# Patient Record
Sex: FEMALE | Race: BLACK OR AFRICAN AMERICAN | Employment: STUDENT | URBAN - METROPOLITAN AREA
[De-identification: names, ages, dates, MRNs, and addresses within clinical notes are randomized per-mention and may not be internally consistent; named-entity substitution may affect disease eponyms.]

---

## 2021-08-18 ENCOUNTER — APPOINTMENT (OUTPATIENT)
Dept: URGENT CARE | Facility: CLINIC | Age: 26
End: 2021-08-18

## 2021-08-18 DIAGNOSIS — Z02.1 PRE-EMPLOYMENT HEALTH SCREENING EXAMINATION: Primary | ICD-10-CM

## 2021-08-18 PROCEDURE — 86480 TB TEST CELL IMMUN MEASURE: CPT

## 2021-08-20 LAB
GAMMA INTERFERON BACKGROUND BLD IA-ACNC: 0.03 IU/ML
M TB IFN-G BLD-IMP: NEGATIVE
M TB IFN-G CD4+ BCKGRND COR BLD-ACNC: -0.01 IU/ML
M TB IFN-G CD4+ BCKGRND COR BLD-ACNC: -0.01 IU/ML
MITOGEN IGNF BCKGRD COR BLD-ACNC: >10 IU/ML

## 2024-08-19 ENCOUNTER — EMERGENCY (EMERGENCY)
Facility: HOSPITAL | Age: 29
LOS: 1 days | Discharge: HOME | End: 2024-08-19
Attending: EMERGENCY MEDICINE | Admitting: EMERGENCY MEDICINE
Payer: COMMERCIAL

## 2024-08-19 VITALS
DIASTOLIC BLOOD PRESSURE: 71 MMHG | OXYGEN SATURATION: 100 % | HEART RATE: 91 BPM | RESPIRATION RATE: 18 BRPM | SYSTOLIC BLOOD PRESSURE: 105 MMHG | HEIGHT: 66 IN | WEIGHT: 164.91 LBS | TEMPERATURE: 99 F

## 2024-08-19 PROCEDURE — 93010 ELECTROCARDIOGRAM REPORT: CPT

## 2024-08-19 PROCEDURE — 99284 EMERGENCY DEPT VISIT MOD MDM: CPT

## 2024-08-19 PROCEDURE — 71046 X-RAY EXAM CHEST 2 VIEWS: CPT | Mod: 26

## 2024-08-19 RX ORDER — DEXAMETHASONE 1.5 MG/1
10 TABLET ORAL ONCE
Refills: 0 | Status: DISCONTINUED | OUTPATIENT
Start: 2024-08-19 | End: 2024-08-19

## 2024-08-19 RX ORDER — DEXAMETHASONE 1.5 MG/1
10 TABLET ORAL ONCE
Refills: 0 | Status: COMPLETED | OUTPATIENT
Start: 2024-08-19 | End: 2024-08-19

## 2024-08-19 RX ORDER — ALBUTEROL 90 MCG
2 AEROSOL REFILL (GRAM) INHALATION
Qty: 1 | Refills: 0
Start: 2024-08-19

## 2024-08-19 RX ORDER — IPRATROPIUM BROMIDE AND ALBUTEROL SULFATE 2.5; .5 MG/3ML; MG/3ML
3 SOLUTION RESPIRATORY (INHALATION) ONCE
Refills: 0 | Status: COMPLETED | OUTPATIENT
Start: 2024-08-19 | End: 2024-08-19

## 2024-08-19 RX ADMIN — IPRATROPIUM BROMIDE AND ALBUTEROL SULFATE 3 MILLILITER(S): 2.5; .5 SOLUTION RESPIRATORY (INHALATION) at 22:26

## 2024-08-19 NOTE — ED ADULT TRIAGE NOTE - CHIEF COMPLAINT QUOTE
Pt presents to ED ambulatory from home with c/o cough, chest pain and shortness of breath x 1 day. Pt reports hx of asthma, diagnosed with COVID last week.

## 2024-08-19 NOTE — ED PROVIDER NOTE - PATIENT PORTAL LINK FT
You can access the FollowMyHealth Patient Portal offered by [facility] by registering at the following website: [email or web address]. By joining [facility] FollowMyHealth portal, you will also be able to view your health information using other applications (apps) compatible with our system.

## 2024-08-19 NOTE — ED ADULT TRIAGE NOTE - HEIGHT IN CM
Patient Education     Viral Syndrome (Child)  A virus is the most common cause of illness among children. This may cause a number of different symptoms, depending on what part of the body is affected. If the virus settles in the nose, throat, and lungs, it causes cough, congestion, and sometimes headache. If it settles in the stomach and intestinal tract, it causes vomiting and diarrhea. Sometimes it causes vague symptoms of \"feeling bad all over,\" with fussiness, poor appetite, poor sleeping, and lots of crying. A light rash may also appear for the first few days, then fade away.  A viral illness usually lasts 3 to 5 days, but sometimes it lasts longer, even up to 1 to 2 weeks. Home measures are all that are needed to treat a viral illness. Antibiotics don't help. Occasionally, a more serious bacterial infection can look like a viral syndrome in the first few days of the illness.   Home care  Follow these guidelines to care for your child at home:  · Fluids. Fever increases water loss from the body. For infants under 1 year old, continue regular feedings (formula or breast). Between feedings give oral rehydration solution, which is available from groceries and drugstores without a prescription. For children older than 1 year, give plenty of fluids like water, juice, ginger ale, lemonade, fruit-based drinks, or popsicles.    · Food. If your child doesn't want to eat solid foods, it's OK for a few days, as long as he or she drinks lots of fluid. (If your child has been diagnosed with a kidney disease, ask your child’s doctor how much and what types of fluids your child should drink to prevent dehydration. If your child has kidney disease, drinking too much fluid can cause it build up in the body and be dangerous to your child’s health.)  · Activity. Keep children with a fever at home resting or playing quietly. Encourage frequent naps. Your child may return to day care or school when the fever is gone and he or she  is eating well and feeling better.  · Sleep. Periods of sleeplessness and irritability are common. Give your child plenty of time to sleep.  ? For children 1 year and older: Have your child sleep in a slightly upright position. This is to help make breathing easier. If possible, raise the head of the bed slightly. Or raise your older child’s head and upper body up with extra pillows. Talk with your healthcare provider about how far to raise your child's head.  ? For babies younger than 12 months:  Never use pillows or put your baby to sleep on their stomach or side. Babies younger than 12 months should sleep on a flat, firm surface on their back. Don't use car seats, strollers, swings, baby carriers, or baby slings for sleep. If your baby falls asleep in one of these, move them to a flat, firm surface as soon as you can.  · Cough. Coughing is a normal part of this illness. A cool mist humidifier at the bedside may be helpful. Over-the-counter (OTC) cough and cold medicine has not been proved to be any more helpful than sweet syrup with no medicine in it. But these medicines can produce serious side effects, especially in infants younger than 2 years. Don’t give OTC cough and cold medicines to children under age 6 years unless your healthcare provider has specifically advised you to do so. Also, don’t expose your child to cigarette smoke. It can make the cough worse.  · Nasal congestion. Suction the nose of infants with a rubber bulb syringe. You may put 2 to 3 drops of saltwater (saline) nose drops in each nostril before suctioning to help remove secretions. Saline nose drops are available without a prescription. You can make it by adding 1/4 teaspoon table salt in 1 cup of water.  · Fever. You may give your child acetaminophen or ibuprofen to control pain and fever, unless another medicine was prescribed for this. If your child has chronic liver or kidney disease or ever had a stomach ulcer or gastrointestinal  bleeding, talk with your healthcare provider before using these medicines. Don't give aspirin to anyone younger than 18 years who is ill with a fever. It may cause severe disease or death.  · Prevention. Wash your hands before and after touching your sick child to help prevent giving a new illness to your child and to prevent spreading this viral illness to yourself and to other children.  Follow-up care  Follow up with your child's healthcare provider as advised.  When to seek medical advice  Unless your child's healthcare provider advises otherwise, call the provider right away if:  · Your child has a fever (see Fever and children, below)  · Your child is fussy or crying and cannot be soothed  · Your child has an earache, sinus pain, stiff or painful neck, or headache  · Your child has increasing abdominal pain or pain that is not getting better after 8 hours  · Your child has repeated diarrhea or vomiting  · A new rash appears  · Your child has signs of dehydration: No wet diapers for 8 hours in infants, little or no urine older children, very dark urine, sunken eyes  · Your child has burning when urinating  Call 911  Call 911 if any of the following occur:  · Lips or skin that turn blue, purple, or gray  · Neck stiffness or rash with a fever  · Convulsion (seizure)  · Wheezing or trouble breathing  · Unusual fussiness or drowsiness  · Confusion  Fever and children  Always use a digital thermometer to check your child’s temperature. Never use a mercury thermometer.  For infants and toddlers, be sure to use a rectal thermometer correctly. A rectal thermometer may accidentally poke a hole in (perforate) the rectum. It may also pass on germs from the stool. Always follow the product maker’s directions for proper use. If you don’t feel comfortable taking a rectal temperature, use another method. When you talk to your child’s healthcare provider, tell him or her which method you used to take your child’s  temperature.  Here are guidelines for fever temperature. Ear temperatures aren’t accurate before 6 months of age. Don’t take an oral temperature until your child is at least 4 years old.  Infant under 3 months old:  · Ask your child’s healthcare provider how you should take the temperature.  · Rectal or forehead (temporal artery) temperature of 100.4°F (38°C) or higher, or as directed by the provider  · Armpit temperature of 99°F (37.2°C) or higher, or as directed by the provider  Child age 3 to 36 months:  · Rectal, forehead (temporal artery), or ear temperature of 102°F (38.9°C) or higher, or as directed by the provider  · Armpit temperature of 101°F (38.3°C) or higher, or as directed by the provider  Child of any age:  · Repeated temperature of 104°F (40°C) or higher, or as directed by the provider  · Fever that lasts more than 24 hours in a child under 2 years old. Or a fever that lasts for 3 days in a child 2 years or older.  [employer] last reviewed this educational content on 4/1/2018  © 4983-4177 [employer]. All rights reserved. This information is not intended as a substitute for professional medical care. Always follow your healthcare professional's instructions.      Coronavirus Disease 2019 (COVID-19): Overview  Coronavirus disease 2019 (COVID-19) is a respiratory illness. It's caused by a new (novel) coronavirus. There are many types of coronavirus. Coronaviruses are a very common cause of colds and bronchitis. They may sometimes cause lung infection (pneumonia). Symptoms can range from mild to severe. Some people have no symptoms. These viruses are also found in some animals.   All 50 states in the [place] have reported cases of COVID-19. Many areas report \"community spread\" of COVID-19. This means the source of the illness is not known. COVID-19 is a rapidly-emerging infectious disease. This means that scientists are actively researching it. There are information updates regularly.   Public  health officials are working to find the source. How the virus spreads is not yet fully understood, but it seems to spread and infect people fairly easily. Some people who have been infected in an area may not be sure how or where they were infected. The virus may be spread through droplets of fluid that a person coughs or sneezes into the air. It may be spread if you touch a surface with the virus on it, such as a handle or object, and then touch your eyes, nose, or mouth.      To help prevent spreading the infection, wash your hands often, or use an alcohol-based hand .   For the latest information, visit the CDC website at www.cdc.gov/coronavirus/2019-ncov. Or call 004-MKM-YDMP (486-941-9065).   What are the symptoms of COVID-19?  Some people have no symptoms or mild symptoms. Symptoms can also vary from person to person. As experts learn more about COVID-19, other symptoms are being reported. Symptoms may appear 2 to 14 days after contact with the virus:   · Fever or chills  · Coughing  · Trouble breathing or feeling short of breath  · Sore throat  · Stuffy or runny nose  · Headache and body aches  · Fatigue  · Nausea, vomiting, diarrhea, or abdominal pain  · New loss of sense of smell or taste  You can check your symptoms with the CDC’s Coronavirus Self-.   What are possible complications from COVID-19?  In many cases, this virus can cause infection (pneumonia) in both lungs. In some cases, this can cause death. Certain people are at higher risk for complications. This includes older adults and people with serious chronic health conditions such as heart or lung disease, diabetes, or kidney disease. It includes people with health conditions that suppress the immune system. And it includes people taking medicines that suppress the immune system.   As experts learn more about COVID-19, other complications are being reported that may be linked to COVID-19. Rarely, some children have developed severe  complications called multisystem inflammatory syndrome in children (MIS-C). MIS-C seems to be similar to [place] disease, a rare condition causing inflammation of blood vessels and body organs. It's not yet known if MIS-C happens only in children, or if adults are also at risk. It's also not known if it's related to COVID-19, because many children, but not all, have tested positive for the virus. Experts continue to study MIS-C. The CDC advises healthcare providers to report to local health departments any person under age 21 years old who is ill enough to be in the hospital and has all of the following:   · A fever over 100.4°F (38.0°C) for more than 24 hours and a positive SARS-CoV-2 test or exposure to the virus in the last 4 weeks  · Inflammation in at least 2 organs such as the heart, lungs, or kidneys with lab tests that show inflammation  · No other diagnoses besides COVID-19 explain the child's symptoms  How is COVID-19 diagnosed?  Your healthcare provider will ask about your symptoms. He or she will ask where you live, and about your recent travel, and any contact with sick people. If your healthcare provider thinks you may have COVID-19, he or she will consider whether to test you for COVID-19. This depends on the availability of testing in your area, and how sick you are. Follow all instructions from your healthcare provider. Guidelines for testing may change as more information about the virus becomes available. Diagnostic tests are used to find a current COVID-19 infection. These include:   · Viral (molecular) test.  You may also hear this called a RT-PCR test. Viral tests are very accurate. A viral test looks for the SARS-CoV-2 virus's genetic material. There are a few ways to do this. A nose-throat swab may be wiped inside your nose to the very back of your throat. Other tests are either done by nose or throat swab. Or a sample of your saliva may be taken. Availability of tests vary by location. Some  test kits can be done at home but must be sent to a lab to be checked. Depending on the test, some results are back within about 30 minutes. Some tests must be sent to a lab and can take several days before the results are back. Home test kits are now available but vary by location, and some need a prescription. Some kits get results quickly at home. Others must be sent to a lab for the results.  · Antigen test. This can find proteins from the SARS-CoV-2 virus. This is done by a nose or a nose-throat swab. Depending on the test, some results are back within an hour. Positive results are highly accurate, but false positives can happen, especially in places where few people have the virus. Antigen tests are more likely to miss a COVID-19 infection than a viral (molecular) test. If your antigen test is negative but you have symptoms of COVID-19, your healthcare provider may order a viral test.  If your healthcare provider thinks or confirms that you have COVID-19, you may have other tests. These tests may include:   · Antibody blood test.  Antibody tests are being looked at to find out if a person has previously been infected with the virus and may now have antibodies such as SARS AB IgG in their blood to give some immunity. The accuracy and availability of antibody tests vary. An antibody test may not be able to show if you have a current infection because it can take up to a few weeks after infection to make antibodies. It's not yet known how long immunity lasts after being infected with the virus.  · Sputum culture.  A small sample of mucus coughed from your lungs (sputum) may be collected if you have a moist cough. It may be checked for the virus or to look for pneumonia.  · Imaging tests.  You may have a chest X-ray or CT scan.  Note about reinfection and your immunity  At this time, it's unclear if people can be reinfected with COVID-19. The CDC notes that if a person has fully recovered from COVID-19 and is  retested within 3 months of the first infection, they may continue to have low levels of the virus in their body and test positive for COVID-19, even though they are not spreading COVID-19. Having a positive COVID-19 test after an infection doesn't mean you can't be reinfected. It's not yet known how long immunity lasts after being infected with the virus.   How is COVID-19 treated?  There is currently no vaccine or medicine approved to prevent COVID-19.   The most proven treatments right now are those to help your body while it fights the virus. This is known as supportive care. For serious COVID-19, you may need to stay in the hospital. Supportive care may include:   · Getting rest.  This helps your body fight the illness.  · Staying hydrated.  Drinking liquids is the best way to prevent dehydration. Try to drink 6 to 8 glasses of liquids every day, or as advised by your provider. Also check with your provider about which fluids are best for you. Don't drink fluids that contain caffeine or alcohol.  · Taking over-the-counter (OTC) pain medicine.  These are used to help ease pain and reduce fever. Follow your healthcare provider's instructions for which OTC medicine to use.  For severe illness, you may need to stay in the hospital. Care during severe illness may include:   · IV (intravenous) fluids.  These are given through a vein to help keep your body hydrated.  · Oxygen. You may be given supplemental oxygen or ventilation with a breathing machine (ventilator). This is done so you get enough oxygen in your body.  · Prone positioning.  Depending on how sick you are during your hospital stay, your healthcare team may turn you regularly on your stomach. This is called prone positioning. It helps increase the amount of oxygen you get to your lungs. Follow your healthcare team's instructions on position changes while you're in the hospital. Also follow their discharge advice on the best positions to help your  breathing once you go home.  · Remdesivir. The FDA has approved an IV (intravenous) antiviral medicine called remdesivir. It works by stopping the spread of the SARS-CoV-2 virus in the body. It's approved for people in the hospital. It's for people 12 years and older who weigh more than about 88 pounds (40 kgs). Remdesivir is approved only for people who need to be treated in the hospital. In certain cases, it may also be used for people younger than 12 years or who weigh less than about 88 pounds (40 kgs).  Research continues on other therapies that are still experimental. These include:   · COVID-19 convalescent plasma. People who have had COVID-19 and are fully recovered may be asked by their healthcare team to consider donating plasma. This is called COVID-19 convalescent plasma donation. Plasma from people fully recovered from COVID-19 may contain antibodies to help fight COVID-19 in people who are currently seriously ill with the disease. Experts don't know if the donated plasma will work well as a treatment. Research continues, and the FDA has approved it for emergency use in certain people with serious or life-threatening COVID-19. Talk with your provider to learn more about convalescent plasma donation and whether you qualify to donate.  · Bamlanivimab. The FDA recently approved this experimental therapy (monoclonal antibody therapy) for emergency use for certain people who have a positive COVID-19 viral test but are not in the hospital. It's not widely available and is still being investigated. It's approved for people 12 years and older who weigh about 88 pounds (40 kgs) and are at high risk for severe COVID-19 and a hospital stay. This includes people who are 65 years or older and people with certain chronic conditions.  Are you at risk for COVID-19?  You are at risk for COVID-19 if you have had close contact with someone with the virus, or if you live in or traveled to an area with cases of it. Close  contact means being within 6 feet of a person known to have COVID-19 for a total of 15 minutes or more. This could be multiple short encounters that add up to at least 15 minutes over a 24-hour period. Recent studies suggest that COVID-19 may be spread by people who are not showing symptoms.   Date last modified: 11/20/2020   [employer] last reviewed this educational content on 1/1/2020  © 2601-6847 [employer]. All rights reserved. This information is not intended as a substitute for professional medical care. Always follow your healthcare professional's instructions.                [phone]

## 2024-08-19 NOTE — ED PROVIDER NOTE - PSYCHIATRIC, MLM
Is This A New Presentation, Or A Follow-Up?: Skin Lesion
What Type Of Note Output Would You Prefer (Optional)?: Standard Output
How Severe Is Your Skin Lesion?: mild
Has Your Skin Lesion Been Treated?: not been treated
Alert and oriented to person, place, time/situation. normal mood and affect. no apparent risk to self or others.

## 2024-08-19 NOTE — ED PROVIDER NOTE - NSFOLLOWUPINSTRUCTIONS_ED_ALL_ED_FT
You were evaluated here in the emergency department for cough and feeling of shortness of breath.  You were examined, hide EKG, chest x-ray done and were given a nebulizer treatment.  Indicated that you are feeling somewhat improved.  In addition, you are given a dose of dexamethasone prior to your discharge.  A prescription for an albuterol inhaler was sent to your pharmacy.  At this time, your x-ray did not show any signs of pneumonia.  Your symptoms are likely residual from the recent COVID infection.  Please continue to use your inhaler every 4-6 hours as needed.  If you develop worsening signs such as shortness of breath, chest pain and feel that you are getting worse, not improving, return to the emergency department.    Please let your primary care physician know that you were here in the emergency department.  Please follow-up within 1-2 weeks.

## 2024-08-19 NOTE — ED PROVIDER NOTE - PROGRESS NOTE DETAILS
Patient reassessed.  She states she feels better.  She states that she thinks the nebulizer helped.  Will give 1 dose of Decadron given persistent symptoms of cough, mild sore throat.  Plan to send inhaler to her pharmacy.  Patient otherwise feels comfortable with discharge.

## 2024-08-19 NOTE — ED PROVIDER NOTE - CLINICAL SUMMARY MEDICAL DECISION MAKING FREE TEXT BOX
Tao MADSEN: Patient is a 28-year-old female with a history of asthma here for evaluation of persistent cough and feeling of shortness of breath.   She denies any history of intubations, bipap use. Patient reports that she had COVID about 2 weeks ago.  She states she went to urgent care on 8/8 and was given an albuterol inhaler.  She states she has been using her inhaler frequently.  She states that she has been coughing.  No recent fevers.  She denies any chest pain when she is not coughing.  Denies any pleuritic pain.  No leg swelling.  No history of DVT/PE.  Patient states that she developed pneumonia after COVID in the past and she is concerned.  She denies any nausea, vomiting or diarrhea.  On exam, patient has no wheezing.  She has very slight erythema in her throat that she attributes to coughing.  Her lungs sound clear to auscultation.  Plan for [facility]oNeb, chest x-ray.  EKG was reviewed and it is NSR without ST elevations or depressions.  No suspicion for PE at this time.

## 2024-08-19 NOTE — ED PROVIDER NOTE - OBJECTIVE STATEMENT
Tao MADSEN: Patient is a 28-year-old female with a history of asthma here for evaluation of persistent cough and feeling of shortness of breath.  She denies any history of intubations, bipap use. Patient reports that she had COVID about 2 weeks ago.  She states she went to urgent care on 8/8 and was given an albuterol inhaler.  She states she has been using her inhaler frequently.  She states that she has been coughing.  No recent fevers.  She denies any chest pain when she is not coughing.  Denies any pleuritic pain.  No leg swelling.  No history of DVT/PE.  Patient states that she developed pneumonia after COVID in the past and she is concerned.  She denies any nausea, vomiting or diarrhea.

## 2024-08-20 RX ADMIN — DEXAMETHASONE 10 MILLIGRAM(S): 1.5 TABLET ORAL at 00:11
